# Patient Record
Sex: FEMALE | Race: WHITE | Employment: STUDENT | URBAN - NONMETROPOLITAN AREA
[De-identification: names, ages, dates, MRNs, and addresses within clinical notes are randomized per-mention and may not be internally consistent; named-entity substitution may affect disease eponyms.]

---
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
## 2022-06-13 ENCOUNTER — HOSPITAL ENCOUNTER (EMERGENCY)
Age: 22
Discharge: HOME | End: 2022-06-13
Payer: COMMERCIAL

## 2022-06-13 VITALS
WEIGHT: 203 LBS | RESPIRATION RATE: 18 BRPM | HEART RATE: 62 BPM | SYSTOLIC BLOOD PRESSURE: 141 MMHG | DIASTOLIC BLOOD PRESSURE: 78 MMHG | TEMPERATURE: 98.7 F | OXYGEN SATURATION: 97 %

## 2022-06-13 DIAGNOSIS — R45.851 DEPRESSION WITH SUICIDAL IDEATION: Primary | ICD-10-CM

## 2022-06-13 DIAGNOSIS — F32.A DEPRESSION WITH SUICIDAL IDEATION: Primary | ICD-10-CM

## 2022-06-13 LAB
ACETAMINOPHEN LEVEL: < 5 UG/ML (ref 0–20)
ALBUMIN SERPL-MCNC: 3.9 G/DL (ref 3.5–5.1)
ALP BLD-CCNC: 101 U/L (ref 38–126)
ALT SERPL-CCNC: 25 U/L (ref 11–66)
AMPHETAMINE+METHAMPHETAMINE URINE SCREEN: NEGATIVE
ANION GAP SERPL CALCULATED.3IONS-SCNC: 11 MEQ/L (ref 8–16)
AST SERPL-CCNC: 23 U/L (ref 5–40)
BARBITURATE QUANTITATIVE URINE: NEGATIVE
BASOPHILS # BLD: 0.5 %
BASOPHILS ABSOLUTE: 0.1 THOU/MM3 (ref 0–0.1)
BENZODIAZEPINE QUANTITATIVE URINE: NEGATIVE
BILIRUB SERPL-MCNC: 0.3 MG/DL (ref 0.3–1.2)
BILIRUBIN DIRECT: < 0.2 MG/DL (ref 0–0.3)
BUN BLDV-MCNC: 8 MG/DL (ref 7–22)
CALCIUM SERPL-MCNC: 9.3 MG/DL (ref 8.5–10.5)
CANNABINOID QUANTITATIVE URINE: NEGATIVE
CHLORIDE BLD-SCNC: 105 MEQ/L (ref 98–111)
CO2: 24 MEQ/L (ref 23–33)
COCAINE METABOLITE QUANTITATIVE URINE: NEGATIVE
CREAT SERPL-MCNC: 1 MG/DL (ref 0.4–1.2)
EOSINOPHIL # BLD: 2.4 %
EOSINOPHILS ABSOLUTE: 0.3 THOU/MM3 (ref 0–0.4)
ERYTHROCYTE [DISTWIDTH] IN BLOOD BY AUTOMATED COUNT: 12.2 % (ref 11.5–14.5)
ERYTHROCYTE [DISTWIDTH] IN BLOOD BY AUTOMATED COUNT: 39.1 FL (ref 35–45)
ETHYL ALCOHOL, SERUM: < 0.01 %
GLUCOSE BLD-MCNC: 94 MG/DL (ref 70–108)
HCT VFR BLD CALC: 35.8 % (ref 37–47)
HEMOGLOBIN: 11.6 GM/DL (ref 12–16)
IMMATURE GRANS (ABS): 0.04 THOU/MM3 (ref 0–0.07)
IMMATURE GRANULOCYTES: 0.4 %
LYMPHOCYTES # BLD: 39.1 %
LYMPHOCYTES ABSOLUTE: 4.1 THOU/MM3 (ref 1–4.8)
MCH RBC QN AUTO: 28.4 PG (ref 26–33)
MCHC RBC AUTO-ENTMCNC: 32.4 GM/DL (ref 32.2–35.5)
MCV RBC AUTO: 87.7 FL (ref 81–99)
MONOCYTES # BLD: 5.7 %
MONOCYTES ABSOLUTE: 0.6 THOU/MM3 (ref 0.4–1.3)
NUCLEATED RED BLOOD CELLS: 0 /100 WBC
OPIATES, URINE: NEGATIVE
OSMOLALITY CALCULATION: 277.5 MOSMOL/KG (ref 275–300)
OXYCODONE: NEGATIVE
PHENCYCLIDINE QUANTITATIVE URINE: NEGATIVE
PLATELET # BLD: 302 THOU/MM3 (ref 130–400)
PMV BLD AUTO: 8.9 FL (ref 9.4–12.4)
POTASSIUM REFLEX MAGNESIUM: 3.9 MEQ/L (ref 3.5–5.2)
PREGNANCY, URINE: NEGATIVE
RBC # BLD: 4.08 MILL/MM3 (ref 4.2–5.4)
SALICYLATE, SERUM: < 0.3 MG/DL (ref 2–10)
SEG NEUTROPHILS: 51.9 %
SEGMENTED NEUTROPHILS ABSOLUTE COUNT: 5.5 THOU/MM3 (ref 1.8–7.7)
SODIUM BLD-SCNC: 140 MEQ/L (ref 135–145)
TOTAL PROTEIN: 7.1 G/DL (ref 6.1–8)
TSH SERPL DL<=0.05 MIU/L-ACNC: 1.57 UIU/ML (ref 0.4–4.2)
WBC # BLD: 10.6 THOU/MM3 (ref 4.8–10.8)

## 2022-06-13 PROCEDURE — 82077 ASSAY SPEC XCP UR&BREATH IA: CPT

## 2022-06-13 PROCEDURE — 80179 DRUG ASSAY SALICYLATE: CPT

## 2022-06-13 PROCEDURE — 80143 DRUG ASSAY ACETAMINOPHEN: CPT

## 2022-06-13 PROCEDURE — 84443 ASSAY THYROID STIM HORMONE: CPT

## 2022-06-13 PROCEDURE — 80076 HEPATIC FUNCTION PANEL: CPT

## 2022-06-13 PROCEDURE — 81025 URINE PREGNANCY TEST: CPT

## 2022-06-13 PROCEDURE — 36415 COLL VENOUS BLD VENIPUNCTURE: CPT

## 2022-06-13 PROCEDURE — 80048 BASIC METABOLIC PNL TOTAL CA: CPT

## 2022-06-13 PROCEDURE — 85025 COMPLETE CBC W/AUTO DIFF WBC: CPT

## 2022-06-13 PROCEDURE — 80307 DRUG TEST PRSMV CHEM ANLYZR: CPT

## 2022-06-13 PROCEDURE — 99285 EMERGENCY DEPT VISIT HI MDM: CPT

## 2022-06-13 ASSESSMENT — PATIENT HEALTH QUESTIONNAIRE - PHQ9: SUM OF ALL RESPONSES TO PHQ QUESTIONS 1-9: 9

## 2022-06-13 ASSESSMENT — ENCOUNTER SYMPTOMS
DIARRHEA: 0
ABDOMINAL PAIN: 0
CONSTIPATION: 0
WHEEZING: 0
VOMITING: 0
COUGH: 0
RHINORRHEA: 0
NAUSEA: 0
BLOOD IN STOOL: 0
SHORTNESS OF BREATH: 0
EYE PAIN: 0

## 2022-06-13 ASSESSMENT — SLEEP AND FATIGUE QUESTIONNAIRES
DO YOU HAVE DIFFICULTY SLEEPING: NO
DO YOU USE A SLEEP AID: NO
AVERAGE NUMBER OF SLEEP HOURS: 6

## 2022-06-13 ASSESSMENT — LIFESTYLE VARIABLES: HOW OFTEN DO YOU HAVE A DRINK CONTAINING ALCOHOL: NEVER

## 2022-06-13 ASSESSMENT — PAIN - FUNCTIONAL ASSESSMENT: PAIN_FUNCTIONAL_ASSESSMENT: NONE - DENIES PAIN

## 2022-06-14 NOTE — PROGRESS NOTES
Chief Complaint:    Suicidal thought      Provisional Diagnosis:   Unspecified Depressive Disorder       Risk, Psychosocial and Contextual Factors: (homeless, lack of social support etc.): Pt and her child's father are , pt had a traumatic child birth last month       Current  Treatment:  Counseling at [facility] in [address] prescribed by OBGYN at [facility] in [address] [facility]       Present Suicidal Behavior:    Verbal: Denies     Attempt: Denies       Access to Weapons: Denies       C-SSRS Current Suicide Risk: Low, Moderate or High:    Low       Past Suicidal Behavior:    Verbal: X    Attempts: Denies       Self-Injurious/Self-Mutilation: Denies       Traumatic Event Within Past 2 Weeks: (Specify)       Current Abuse:  Denies       Legal: Denies       Violence: Denies       Protective Factors:   Family and friends are very supportive, pt is linked to counseling services       Housing: Resides with child and child's father       CPAP/Oxygen/Ambulation Difficulties: Denies       Basic Vital Signs: See epic       Critical Labs:      Risk Factors:  Suicidal thoughts yesterday, fleeting suicidal thought earlier today, traumatic child birth last month, pt and child's father are        Clinical Summary:      Pt is a 25year old female escorted to [facility] ED voluntarily by her mother, Jesika Richmond, at the advice of pts family doctor due to suicidal thoughts. Pts mother remained present for the assessment as authorized by pt. Pt state suicidal thoughts started yesterday, no plan, no intent \"they were bad yesterday but not as bad today\". Pt described a fleeting suicidal thought earlier today, no plan, no intent, denies current suicidal thought, denies homicidal thought, denies hallucinations, no delusions noted. Pt reportedly had an appointment with her doctor today to transition back to effexor instead of zoloft as it assisted with her depression better \"but she wanted me to get evaluated first\".   Pt was placed on zoloft due to pregnancy, had the baby on 5/19/22. Pt state the birth was traumatic as the baby was born five weeks early and pt had to receive a blood transfusion. Pt currently resides with her child's father however state they are . Pt denies a history of inpatient psychiatric treatment, is linked to outpatient counseling at [facility] in [place], [place], pt plan on contacting her counselor to schedule an earlier appointment, denies drug use, denies alcohol use, report normal sleep, loss of appetite. Pt is alert, oriented x4, good insight, impaired judgment, linear thought, good eye contact, friendly, cooperative, endorse depression and anhedonia. Perry Hobson state the plan is for pt and her child to return home. Pt has a very supportive family. Pt is also employed at [employer]. Level of Care Disposition:      2202  Pt medically cleared by Graeme MENESES.     2204  Consult with Dr. Tanmay Rodriguez who recommend outpatient mental health follow up.    2206  Graeme JACKSON-CNP updated. 2208 Pt and her mother updated. Safety plan includes:    Pt will return home with her mother who will monitor pt. Perry Hobson is currently off work to assist pt. Pt to follow up with PCP regarding psychotropic medication. Pt will contact counselor at [facility] tomorrow for an earlier appointment. Pt to return if symptoms worsen.

## 2022-06-14 NOTE — ED NOTES
Pt presents ambulatory to ED via triage with mother for c/o post partum suicidal ideation. Pt had a baby girl 3 weeks ago at 35wks gestation. Pt and mother report experience being traumatizing with events not going as planned. Mother reports pt was on Effexor prior to pregnancy and was switched to Zoloft after pregnancy confirmation. Pt reports feeling as though she could wait the 6 weeks post partum to get back on Effexor and continue Zoloft, however the thoughts became too strong. Upon initial assessment, pt is A&Ox4, resps easy and unlabored. Pt reports \"I just want to get better for my baby. \" Pt denies any current plan and denies homicidal ideation. Pt ambulated under own will to restroom to provide urine specimen. Rosario Araujo NP at bedside for assessment. Will monitor.      Cody Wharton RN  06/13/22 2051

## 2022-06-14 NOTE — ED PROVIDER NOTES
[address] [facility] [address] [address] EMERGENCY DEPT  EMERGENCY MEDICINE     Pt Name: Annalisa Moreland  MRN: [id number]  [place] 2000  Date of evaluation: 6/13/2022  PCP:    Hugo Karimi MD  Provider: MANUEL Jimenez - CNP    CHIEF COMPLAINT       Chief Complaint   Patient presents with    Suicidal     3 weeks post partum           HISTORY OF PRESENT ILLNESS    Annalisa Moreland is a 25 y.o. female patient that presents to ER with concern for suicidal ideation. Patient states that she has had thoughts about hurting herself recently. Patient states that since she had her baby 3 weeks ago she has had some depression. Patient did have a traumatic birth per her provider. Her provider Rosanna JACKSON called and said that patient told her that she was having suicidal thoughts and wanted to slit her wrists. Ros Titus did talk patient into coming into the ER and that is why patient presented today. Patient does admit to having suicidal thoughts and a plan to slit her wrist.  Patient denies any physical symptoms including chest pain, shortness of breath, nausea, vomiting, diarrhea or fever. Patient does state that she has not been eating or drinking well for the last 2 months. She states that she did eat breakfast this morning, but has not eaten since. She does state that she is hungry. Patient does state that she has been sleeping well whenever the baby sleeps. Triage notes and Nursing notes were reviewed by myself. Any discrepancies are addressed above. PAST MEDICAL HISTORY     History reviewed. No pertinent past medical history. SURGICAL HISTORY       History reviewed. No pertinent surgical history. CURRENT MEDICATIONS       There are no discharge medications for this patient. ALLERGIES       No Known Allergies    FAMILY HISTORY       History reviewed. No pertinent family history.      SOCIAL HISTORY       Social History     Socioeconomic History    Marital status: Single     Spouse name: None    Number of children: None    Years of education: None    Highest education level: None   Occupational History    None   Tobacco Use    Smoking status: Passive Smoke Exposure - Never Smoker    Smokeless tobacco: Never Used   Substance and Sexual Activity    Alcohol use: No    Drug use: No    Sexual activity: Never   Other Topics Concern    None   Social History Narrative    None     Social Determinants of Health     Financial Resource Strain:     Difficulty of Paying Living Expenses: Not on file   Food Insecurity:     Worried About Running Out of Food in the Last Year: Not on file    Ted of Food in the Last Year: Not on file   Transportation Needs:     Lack of Transportation (Medical): Not on file    Lack of Transportation (Non-Medical): Not on file   Physical Activity:     Days of Exercise per Week: Not on file    Minutes of Exercise per Session: Not on file   Stress:     Feeling of Stress : Not on file   Social Connections:     Frequency of Communication with Friends and Family: Not on file    Frequency of Social Gatherings with Friends and Family: Not on file    Attends Jew Services: Not on file    Active Member of [address] [place] [address] or Organizations: Not on file    Attends Club or Organization Meetings: Not on file    Marital Status: Not on file   Intimate Partner Violence:     Fear of Current or Ex-Partner: Not on file    Emotionally Abused: Not on file    Physically Abused: Not on file    Sexually Abused: Not on file   Housing Stability:     Unable to Pay for Housing in the Last Year: Not on file    Number of [place] in the Last Year: Not on file    Unstable Housing in the Last Year: Not on file       REVIEW OF SYSTEMS     Review of Systems   Constitutional: Negative for appetite change, chills, fatigue, fever and unexpected weight change. HENT: Negative for ear pain and rhinorrhea. Eyes: Negative for pain and visual disturbance.    Respiratory: Negative for cough, shortness of breath and wheezing. Cardiovascular: Negative for chest pain, palpitations and leg swelling. Gastrointestinal: Negative for abdominal pain, blood in stool, constipation, diarrhea, nausea and vomiting. Genitourinary: Negative for dysuria, frequency and hematuria. Musculoskeletal: Negative for arthralgias, joint swelling and neck stiffness. Skin: Negative for rash. Neurological: Negative for dizziness, syncope, weakness, light-headedness and headaches. Hematological: Does not bruise/bleed easily. Psychiatric/Behavioral: Positive for suicidal ideas. Except as noted above the remainder of the review of systems was reviewed and is negative. SCREENINGS        [place] Coma Scale  Eye Opening: Spontaneous  Best Verbal Response: Oriented  Best Motor Response: Obeys commands  [place] Coma Scale Score: 15               PHYSICAL EXAM    (up to 7 for level 4, 8 or more for level 5)     ED Triage Vitals [02/19/22 1512]   BP Temp Temp Source Pulse Resp SpO2 Height Weight   (!) 134/95 98.2 °F (36.8 °C) Oral 124 16 100 % -- --       Physical Exam  Vitals and nursing note reviewed. Constitutional:       Appearance: She is not diaphoretic. HENT:      Head: Normocephalic and atraumatic. Right Ear: External ear normal.      Left Ear: External ear normal.   Eyes:      Conjunctiva/sclera: Conjunctivae normal.   Pulmonary:      Effort: Pulmonary effort is normal. No respiratory distress. Abdominal:      General: There is no distension. Musculoskeletal:      Cervical back: Normal range of motion. Skin:     General: Skin is warm and dry. Neurological:      Mental Status: She is alert. Psychiatric:         Mood and Affect: Mood is depressed. Affect is flat. Thought Content: Thought content is not paranoid or delusional. Thought content includes suicidal ideation. Thought content does not include homicidal ideation. Thought content includes suicidal plan.  Thought content does not include homicidal plan. DIAGNOSTIC RESULTS     EKG:(none if blank)  All EKGs are interpreted by the Emergency Department Physician who either signs or Co-signs this chart in the absence of a cardiologist.        RADIOLOGY: (none if blank)   I directly visualized the following images and reviewed the radiologist interpretations. Interpretation per the Radiologist below, if available at the time of this note:  No orders to display       LABS:  Labs Reviewed   SALICYLATE LEVEL - Abnormal; Notable for the following components:       Result Value    Salicylate, Serum < 0.3 (*)     All other components within normal limits   CBC WITH AUTO DIFFERENTIAL - Abnormal; Notable for the following components:    RBC 4.08 (*)     Hemoglobin 11.6 (*)     Hematocrit 35.8 (*)     MPV 8.9 (*)     All other components within normal limits   ETHANOL   ACETAMINOPHEN LEVEL   BASIC METABOLIC PANEL W/ REFLEX TO MG FOR LOW K   HEPATIC FUNCTION PANEL   TSH   URINE DRUG SCREEN   PREGNANCY, URINE   ANION GAP   OSMOLALITY       All other labs were within normal range or not returned as of this dictation. Please note, any cultures that may have been sent were not resulted at the time of this patient visit. EMERGENCY DEPARTMENT COURSE and Medical Decision Making:     Vitals:    Vitals:    06/13/22 2024   BP: (!) 141/78   Pulse: 62   Resp: 18   Temp: 98.7 °F (37.1 °C)   TempSrc: Oral   SpO2: 97%   Weight: 203 lb (92.1 kg)       PROCEDURES: (None if blank)  Procedures    ED Course as of 06/13/22 2241 Mon Jun 13, 2022 2202 No organic cause identified to explain patient's change in psychiatric behavior. Patient is cleared for psychiatric evaluation. [NW]   3471 Reviewed case with Dr. Chandu Payne for discharge.    [NW]      ED Course User Index  [NW] Neil Heads, APRN - CNP     MDM  Number of Diagnoses or Management Options  Depression with suicidal ideation: established, worsening     Amount and/or Complexity of Data Reviewed  Clinical lab tests: ordered and reviewed    Risk of Complications, Morbidity, and/or Mortality  Presenting problems: moderate  Diagnostic procedures: moderate  Management options: moderate      Patient that presents to ER with concern for suicidal ideation. Differential diagnosis includes but not limited to thyroid abnormality, electrolyte abnormality, infection or postpartum depression. No organic cause identified to explain patient's change in psychiatric behavior. Patient is cleared for psychiatric evaluation. Dr. Abhinav Felix feels patient may be safely discharged home. Safety plan was put in place by behavioral access . Please see her note. Follow-up with psychiatric provider in [facility]. Patient instructed to return to ER for worsening symptoms, thoughts about suicide or harming others, chest pain or shortness of breath. Follow-up with your mental health provider as listed above. Strict return precautions and follow up instructions were discussed with the patient with which the patient agrees    ED Medications administered this visit:  Medications - No data to display      FINAL IMPRESSION      1. Depression with suicidal ideation          DISPOSITION/PLAN   DISPOSITION        PATIENT REFERRED TO:  Follow-up as directed by the  with facility in [address]:  There are no discharge medications for this patient.              MANUEL Caballero CNP (electronically signed)           MANUEL Caballero CNP  06/13/22 1616

## 2022-06-14 NOTE — ED NOTES
Report received from [facility]. Pt resting comfortably on cot with family at bedside.       Archana Pérez RN  06/13/22 5976

## 2024-08-10 ENCOUNTER — APPOINTMENT (OUTPATIENT)
Dept: GENERAL RADIOLOGY | Age: 24
End: 2024-08-10
Payer: COMMERCIAL

## 2024-08-10 ENCOUNTER — HOSPITAL ENCOUNTER (EMERGENCY)
Age: 24
Discharge: HOME | End: 2024-08-10
Payer: COMMERCIAL

## 2024-08-10 VITALS
WEIGHT: 230 LBS | HEART RATE: 78 BPM | BODY MASS INDEX: 39.27 KG/M2 | HEIGHT: 64 IN | RESPIRATION RATE: 18 BRPM | OXYGEN SATURATION: 98 % | DIASTOLIC BLOOD PRESSURE: 70 MMHG | TEMPERATURE: 98.8 F | SYSTOLIC BLOOD PRESSURE: 139 MMHG

## 2024-08-10 DIAGNOSIS — S93.601A SPRAIN OF RIGHT FOOT, INITIAL ENCOUNTER: Primary | ICD-10-CM

## 2024-08-10 PROCEDURE — 73630 X-RAY EXAM OF FOOT: CPT

## 2024-08-10 PROCEDURE — 99283 EMERGENCY DEPT VISIT LOW MDM: CPT

## 2024-08-10 RX ORDER — VENLAFAXINE HYDROCHLORIDE 75 MG/1
75 CAPSULE, EXTENDED RELEASE ORAL DAILY
COMMUNITY

## 2024-08-10 ASSESSMENT — PAIN DESCRIPTION - ORIENTATION: ORIENTATION: RIGHT

## 2024-08-10 ASSESSMENT — PAIN - FUNCTIONAL ASSESSMENT: PAIN_FUNCTIONAL_ASSESSMENT: 0-10

## 2024-08-10 ASSESSMENT — PAIN DESCRIPTION - LOCATION: LOCATION: FOOT

## 2024-08-10 ASSESSMENT — PAIN SCALES - GENERAL: PAINLEVEL_OUTOF10: 8

## 2024-08-10 NOTE — ED NOTES
Pt from ED lobby via wheelchair with CC foot injury approx 20 min PTA. Pt stated she was sitting and her foot fell asleep. She went to get and \"I guess I walked on it wrong, because every bone in the top of my foot just popped.\" Pt sitting in chair. Visitor at side. Call light in reach. No distress noted.

## 2024-08-10 NOTE — DISCHARGE INSTRUCTIONS
Call today or tomorrow to follow up with Lance Rivera MD  in 3 days.    Use an ice pack or bag filled with ice and apply to the injured area 3 - 4 times a day for 15 - 20 minutes each time.    Use ibuprofen or Tylenol (unless prescribed medications that have Tylenol in it) for pain.  You can take over the counter Ibuprofen (advil) tablets (4 every 8 hours or 3 every 6 hours or 2 every 4 hours)    Use your crutches for the next several days until you are able to take 10 steps without pain.    Return to the Emergency Department for worsening of pain, increase swelling to the ankle, inability to move your toes, any other care or concern.

## 2024-08-15 ENCOUNTER — APPOINTMENT (OUTPATIENT)
Dept: MRI IMAGING | Age: 24
End: 2024-08-15
Payer: COMMERCIAL

## 2024-08-15 ENCOUNTER — APPOINTMENT (OUTPATIENT)
Dept: CT IMAGING | Age: 24
End: 2024-08-15
Payer: COMMERCIAL

## 2024-08-15 ENCOUNTER — HOSPITAL ENCOUNTER (INPATIENT)
Age: 24
LOS: 2 days | Discharge: HOME | End: 2024-08-17
Attending: EMERGENCY MEDICINE | Admitting: PHYSICIAN ASSISTANT
Payer: COMMERCIAL

## 2024-08-15 ENCOUNTER — APPOINTMENT (OUTPATIENT)
Dept: GENERAL RADIOLOGY | Age: 24
End: 2024-08-15
Payer: COMMERCIAL

## 2024-08-15 DIAGNOSIS — J02.9 SORE THROAT: ICD-10-CM

## 2024-08-15 DIAGNOSIS — A41.9 SEPTICEMIA (HCC): Primary | ICD-10-CM

## 2024-08-15 DIAGNOSIS — J03.90 ACUTE TONSILLITIS, UNSPECIFIED ETIOLOGY: ICD-10-CM

## 2024-08-15 DIAGNOSIS — M54.50 ACUTE MIDLINE LOW BACK PAIN WITHOUT SCIATICA: ICD-10-CM

## 2024-08-15 LAB
ALBUMIN SERPL BCG-MCNC: 3.9 G/DL (ref 3.5–5.1)
ALP SERPL-CCNC: 83 U/L (ref 38–126)
ALT SERPL W/O P-5'-P-CCNC: 29 U/L (ref 11–66)
ANION GAP SERPL CALC-SCNC: 10 MEQ/L (ref 8–16)
ANION GAP SERPL CALC-SCNC: 13 MEQ/L (ref 8–16)
AST SERPL-CCNC: 26 U/L (ref 5–40)
B PERT DNA NPH QL NAA+PROBE: NOT DETECTED
BACTERIA URNS QL MICRO: ABNORMAL /HPF
BASOPHILS ABSOLUTE: 0 THOU/MM3 (ref 0–0.1)
BASOPHILS NFR BLD AUTO: 0.2 %
BILIRUB SERPL-MCNC: 0.4 MG/DL (ref 0.3–1.2)
BILIRUB UR QL STRIP.AUTO: NEGATIVE
BORDETELLA PARAPERTUSSIS BY PCR: NOT DETECTED
BUN SERPL-MCNC: 8 MG/DL (ref 7–22)
BUN SERPL-MCNC: 9 MG/DL (ref 7–22)
C PNEUM DNA SPEC QL NAA+PROBE: NOT DETECTED
CALCIUM SERPL-MCNC: 7.6 MG/DL (ref 8.5–10.5)
CALCIUM SERPL-MCNC: 8.8 MG/DL (ref 8.5–10.5)
CASTS #/AREA URNS LPF: ABNORMAL /LPF
CASTS 2: ABNORMAL /LPF
CHARACTER UR: ABNORMAL
CHLORIDE SERPL-SCNC: 101 MEQ/L (ref 98–111)
CHLORIDE SERPL-SCNC: 104 MEQ/L (ref 98–111)
CK SERPL-CCNC: 41 U/L (ref 30–135)
CO2 SERPL-SCNC: 21 MEQ/L (ref 23–33)
CO2 SERPL-SCNC: 21 MEQ/L (ref 23–33)
COLOR, UA: YELLOW
CREAT SERPL-MCNC: 0.7 MG/DL (ref 0.4–1.2)
CREAT SERPL-MCNC: 0.8 MG/DL (ref 0.4–1.2)
CRP SERPL-MCNC: 5.84 MG/DL (ref 0–1)
CRYSTALS URNS MICRO: ABNORMAL
DEPRECATED RDW RBC AUTO: 40.4 FL (ref 35–45)
EOSINOPHIL NFR BLD AUTO: 0.2 %
EOSINOPHILS ABSOLUTE: 0 THOU/MM3 (ref 0–0.4)
EPITHELIAL CELLS, UA: ABNORMAL /HPF
ERYTHROCYTE [DISTWIDTH] IN BLOOD BY AUTOMATED COUNT: 13.8 % (ref 11.5–14.5)
ERYTHROCYTE [SEDIMENTATION RATE] IN BLOOD BY WESTERGREN METHOD: 34 MM/HR (ref 0–20)
FLUAV RNA NPH QL NAA+PROBE: NOT DETECTED
FLUAV RNA RESP QL NAA+PROBE: NOT DETECTED
FLUBV RNA NPH QL NAA+PROBE: NOT DETECTED
FLUBV RNA RESP QL NAA+PROBE: NOT DETECTED
GFR SERPL CREATININE-BSD FRML MDRD: > 90 ML/MIN/1.73M2
GFR SERPL CREATININE-BSD FRML MDRD: > 90 ML/MIN/1.73M2
GLUCOSE SERPL-MCNC: 102 MG/DL (ref 70–108)
GLUCOSE SERPL-MCNC: 126 MG/DL (ref 70–108)
GLUCOSE UR QL STRIP.AUTO: NEGATIVE MG/DL
HADV DNA NPH QL NAA+PROBE: NOT DETECTED
HCOV 229E RNA SPEC QL NAA+PROBE: NOT DETECTED
HCOV HKU1 RNA SPEC QL NAA+PROBE: NOT DETECTED
HCOV NL63 RNA SPEC QL NAA+PROBE: NOT DETECTED
HCOV OC43 RNA SPEC QL NAA+PROBE: NOT DETECTED
HCT VFR BLD AUTO: 38.4 % (ref 37–47)
HETEROPH AB SERPL QL IA: NEGATIVE
HGB BLD-MCNC: 12.6 GM/DL (ref 12–16)
HGB UR QL STRIP.AUTO: NEGATIVE
HMPV RNA NPH QL NAA+PROBE: NOT DETECTED
HPIV1 RNA NPH QL NAA+PROBE: NOT DETECTED
HPIV2 RNA NPH QL NAA+PROBE: NOT DETECTED
HPIV3 RNA NPH QL NAA+PROBE: NOT DETECTED
HPIV4 RNA NPH QL NAA+PROBE: NOT DETECTED
IMM GRANULOCYTES # BLD AUTO: 0.05 THOU/MM3 (ref 0–0.07)
IMM GRANULOCYTES NFR BLD AUTO: 0.4 %
KETONES UR QL STRIP.AUTO: 80
LACTATE SERPL-SCNC: 1.4 MMOL/L (ref 0.5–2)
LACTIC ACID, SEPSIS: 0.7 MMOL/L (ref 0.5–1.9)
LACTIC ACID, SEPSIS: 0.9 MMOL/L (ref 0.5–1.9)
LYMPHOCYTES ABSOLUTE: 1 THOU/MM3 (ref 1–4.8)
LYMPHOCYTES NFR BLD AUTO: 8.1 %
M PNEUMO DNA SPEC QL NAA+PROBE: NOT DETECTED
MCH RBC QN AUTO: 26.3 PG (ref 26–33)
MCHC RBC AUTO-ENTMCNC: 32.8 GM/DL (ref 32.2–35.5)
MCV RBC AUTO: 80 FL (ref 81–99)
MISCELLANEOUS 2: ABNORMAL
MONOCYTES ABSOLUTE: 0.8 THOU/MM3 (ref 0.4–1.3)
MONOCYTES NFR BLD AUTO: 6.1 %
NEUTROPHILS ABSOLUTE: 10.7 THOU/MM3 (ref 1.8–7.7)
NEUTROPHILS NFR BLD AUTO: 85 %
NITRITE UR QL STRIP: NEGATIVE
NRBC BLD AUTO-RTO: 0 /100 WBC
OSMOLALITY SERPL CALC.SUM OF ELEC: 268.6 MOSMOL/KG (ref 275–300)
OSMOLALITY SERPL CALC.SUM OF ELEC: 270.3 MOSMOL/KG (ref 275–300)
PH UR STRIP.AUTO: 6 [PH] (ref 5–9)
PLATELET # BLD AUTO: 270 THOU/MM3 (ref 130–400)
PMV BLD AUTO: 9.1 FL (ref 9.4–12.4)
POTASSIUM SERPL-SCNC: 3.4 MEQ/L (ref 3.5–5.2)
POTASSIUM SERPL-SCNC: 3.7 MEQ/L (ref 3.5–5.2)
PROT SERPL-MCNC: 7.1 G/DL (ref 6.1–8)
PROT UR STRIP.AUTO-MCNC: 30 MG/DL
RBC # BLD AUTO: 4.8 MILL/MM3 (ref 4.2–5.4)
RBC URINE: ABNORMAL /HPF
RENAL EPI CELLS #/AREA URNS HPF: ABNORMAL /[HPF]
RSV RNA NPH QL NAA+PROBE: NOT DETECTED
RV+EV RNA SPEC QL NAA+PROBE: DETECTED
S PYO AG THROAT QL: NEGATIVE
S PYO THROAT QL CULT: NORMAL
SARS-COV-2 RNA NPH QL NAA+NON-PROBE: NOT DETECTED
SARS-COV-2 RNA RESP QL NAA+PROBE: NOT DETECTED
SODIUM SERPL-SCNC: 135 MEQ/L (ref 135–145)
SODIUM SERPL-SCNC: 135 MEQ/L (ref 135–145)
SP GR UR REFRACT.AUTO: > 1.03 (ref 1–1.03)
TROPONIN, HIGH SENSITIVITY: < 6 NG/L (ref 0–12)
UROBILINOGEN, URINE: 1 EU/DL (ref 0–1)
WBC # BLD AUTO: 12.6 THOU/MM3 (ref 4.8–10.8)
WBC #/AREA URNS HPF: ABNORMAL /HPF
WBC #/AREA URNS HPF: ABNORMAL /[HPF]
YEAST LIKE FUNGI URNS QL MICRO: ABNORMAL

## 2024-08-15 PROCEDURE — 71046 X-RAY EXAM CHEST 2 VIEWS: CPT

## 2024-08-15 PROCEDURE — 70491 CT SOFT TISSUE NECK W/DYE: CPT

## 2024-08-15 PROCEDURE — 84484 ASSAY OF TROPONIN QUANT: CPT

## 2024-08-15 PROCEDURE — 82550 ASSAY OF CK (CPK): CPT

## 2024-08-15 PROCEDURE — 87880 STREP A ASSAY W/OPTIC: CPT

## 2024-08-15 PROCEDURE — A9579 GAD-BASE MR CONTRAST NOS,1ML: HCPCS | Performed by: EMERGENCY MEDICINE

## 2024-08-15 PROCEDURE — 0202U NFCT DS 22 TRGT SARS-COV-2: CPT

## 2024-08-15 PROCEDURE — 86140 C-REACTIVE PROTEIN: CPT

## 2024-08-15 PROCEDURE — 93005 ELECTROCARDIOGRAM TRACING: CPT | Performed by: EMERGENCY MEDICINE

## 2024-08-15 PROCEDURE — 87070 CULTURE OTHR SPECIMN AEROBIC: CPT

## 2024-08-15 PROCEDURE — 72157 MRI CHEST SPINE W/O & W/DYE: CPT

## 2024-08-15 PROCEDURE — 6370000000 HC RX 637 (ALT 250 FOR IP): Performed by: EMERGENCY MEDICINE

## 2024-08-15 PROCEDURE — 99222 1ST HOSP IP/OBS MODERATE 55: CPT

## 2024-08-15 PROCEDURE — 6360000004 HC RX CONTRAST MEDICATION: Performed by: EMERGENCY MEDICINE

## 2024-08-15 PROCEDURE — 87040 BLOOD CULTURE FOR BACTERIA: CPT

## 2024-08-15 PROCEDURE — 6360000002 HC RX W HCPCS

## 2024-08-15 PROCEDURE — 85025 COMPLETE CBC W/AUTO DIFF WBC: CPT

## 2024-08-15 PROCEDURE — 81001 URINALYSIS AUTO W/SCOPE: CPT

## 2024-08-15 PROCEDURE — 6360000002 HC RX W HCPCS: Performed by: EMERGENCY MEDICINE

## 2024-08-15 PROCEDURE — 74177 CT ABD & PELVIS W/CONTRAST: CPT

## 2024-08-15 PROCEDURE — 87086 URINE CULTURE/COLONY COUNT: CPT

## 2024-08-15 PROCEDURE — 72156 MRI NECK SPINE W/O & W/DYE: CPT

## 2024-08-15 PROCEDURE — 86308 HETEROPHILE ANTIBODY SCREEN: CPT

## 2024-08-15 PROCEDURE — 80053 COMPREHEN METABOLIC PANEL: CPT

## 2024-08-15 PROCEDURE — 72158 MRI LUMBAR SPINE W/O & W/DYE: CPT

## 2024-08-15 PROCEDURE — 85651 RBC SED RATE NONAUTOMATED: CPT

## 2024-08-15 PROCEDURE — 2580000003 HC RX 258: Performed by: EMERGENCY MEDICINE

## 2024-08-15 PROCEDURE — 83605 ASSAY OF LACTIC ACID: CPT

## 2024-08-15 PROCEDURE — 1200000003 HC TELEMETRY R&B

## 2024-08-15 PROCEDURE — 87636 SARSCOV2 & INF A&B AMP PRB: CPT

## 2024-08-15 PROCEDURE — 36415 COLL VENOUS BLD VENIPUNCTURE: CPT

## 2024-08-15 PROCEDURE — 96374 THER/PROPH/DIAG INJ IV PUSH: CPT

## 2024-08-15 PROCEDURE — 99285 EMERGENCY DEPT VISIT HI MDM: CPT

## 2024-08-15 PROCEDURE — 76376 3D RENDER W/INTRP POSTPROCES: CPT

## 2024-08-15 RX ORDER — 0.9 % SODIUM CHLORIDE 0.9 %
1000 INTRAVENOUS SOLUTION INTRAVENOUS ONCE
Status: COMPLETED | OUTPATIENT
Start: 2024-08-15 | End: 2024-08-15

## 2024-08-15 RX ORDER — METRONIDAZOLE 500 MG/1
500 TABLET ORAL ONCE
Status: COMPLETED | OUTPATIENT
Start: 2024-08-15 | End: 2024-08-15

## 2024-08-15 RX ORDER — METRONIDAZOLE 500 MG/100ML
500 INJECTION, SOLUTION INTRAVENOUS ONCE
Status: DISCONTINUED | OUTPATIENT
Start: 2024-08-15 | End: 2024-08-15

## 2024-08-15 RX ORDER — SODIUM CHLORIDE 9 MG/ML
INJECTION, SOLUTION INTRAVENOUS PRN
Status: DISCONTINUED | OUTPATIENT
Start: 2024-08-15 | End: 2024-08-17 | Stop reason: HOSPADM

## 2024-08-15 RX ORDER — SODIUM CHLORIDE 0.9 % (FLUSH) 0.9 %
5-40 SYRINGE (ML) INJECTION EVERY 12 HOURS SCHEDULED
Status: DISCONTINUED | OUTPATIENT
Start: 2024-08-15 | End: 2024-08-17 | Stop reason: HOSPADM

## 2024-08-15 RX ORDER — ACETAMINOPHEN 500 MG
1000 TABLET ORAL
Status: COMPLETED | OUTPATIENT
Start: 2024-08-15 | End: 2024-08-15

## 2024-08-15 RX ORDER — ENOXAPARIN SODIUM 100 MG/ML
30 INJECTION SUBCUTANEOUS EVERY 12 HOURS
Status: DISCONTINUED | OUTPATIENT
Start: 2024-08-15 | End: 2024-08-17 | Stop reason: HOSPADM

## 2024-08-15 RX ORDER — 0.9 % SODIUM CHLORIDE 0.9 %
1000 INTRAVENOUS SOLUTION INTRAVENOUS ONCE
Status: COMPLETED | OUTPATIENT
Start: 2024-08-15 | End: 2024-08-16

## 2024-08-15 RX ORDER — ACETAMINOPHEN 650 MG/1
650 SUPPOSITORY RECTAL EVERY 6 HOURS PRN
Status: DISCONTINUED | OUTPATIENT
Start: 2024-08-15 | End: 2024-08-17 | Stop reason: HOSPADM

## 2024-08-15 RX ORDER — ACETAMINOPHEN 325 MG/1
650 TABLET ORAL EVERY 6 HOURS PRN
Status: DISCONTINUED | OUTPATIENT
Start: 2024-08-15 | End: 2024-08-17 | Stop reason: HOSPADM

## 2024-08-15 RX ORDER — SODIUM CHLORIDE 0.9 % (FLUSH) 0.9 %
5-40 SYRINGE (ML) INJECTION PRN
Status: DISCONTINUED | OUTPATIENT
Start: 2024-08-15 | End: 2024-08-17 | Stop reason: HOSPADM

## 2024-08-15 RX ADMIN — GADOTERIDOL 20 ML: 279.3 INJECTION, SOLUTION INTRAVENOUS at 22:54

## 2024-08-15 RX ADMIN — SODIUM CHLORIDE 1000 ML: 9 INJECTION, SOLUTION INTRAVENOUS at 20:06

## 2024-08-15 RX ADMIN — SODIUM CHLORIDE 1000 ML: 9 INJECTION, SOLUTION INTRAVENOUS at 19:09

## 2024-08-15 RX ADMIN — ACETAMINOPHEN 1000 MG: 500 TABLET ORAL at 20:44

## 2024-08-15 RX ADMIN — Medication 2500 MG: at 21:02

## 2024-08-15 RX ADMIN — METRONIDAZOLE 500 MG: 500 TABLET ORAL at 19:09

## 2024-08-15 RX ADMIN — IOPAMIDOL 80 ML: 755 INJECTION, SOLUTION INTRAVENOUS at 19:46

## 2024-08-15 RX ADMIN — ENOXAPARIN SODIUM 30 MG: 100 INJECTION SUBCUTANEOUS at 23:48

## 2024-08-15 RX ADMIN — CEFTRIAXONE SODIUM 2000 MG: 2 INJECTION, POWDER, FOR SOLUTION INTRAMUSCULAR; INTRAVENOUS at 20:05

## 2024-08-15 ASSESSMENT — ENCOUNTER SYMPTOMS
VOMITING: 0
COLOR CHANGE: 0
NAUSEA: 0
COUGH: 0
ABDOMINAL PAIN: 0
PHOTOPHOBIA: 0
TROUBLE SWALLOWING: 0
CHEST TIGHTNESS: 0
DIARRHEA: 0
BACK PAIN: 1
SORE THROAT: 1
SHORTNESS OF BREATH: 1

## 2024-08-15 ASSESSMENT — PAIN DESCRIPTION - PAIN TYPE: TYPE: ACUTE PAIN

## 2024-08-15 ASSESSMENT — PAIN SCALES - GENERAL
PAINLEVEL_OUTOF10: 6
PAINLEVEL_OUTOF10: 9

## 2024-08-15 ASSESSMENT — PAIN DESCRIPTION - LOCATION
LOCATION: BACK;HEAD

## 2024-08-15 ASSESSMENT — PAIN - FUNCTIONAL ASSESSMENT
PAIN_FUNCTIONAL_ASSESSMENT: 0-10
PAIN_FUNCTIONAL_ASSESSMENT: 0-10

## 2024-08-15 ASSESSMENT — PAIN DESCRIPTION - FREQUENCY: FREQUENCY: CONTINUOUS

## 2024-08-15 NOTE — ED PROVIDER NOTES
[address] [place] [address]  [phone]  Schedule an appointment as soon as possible for a visit in 2 days  For follow up or go to the walk in clinic from 8-4 Monday to Friday    Lance Rivera MD  [facility] [facility] [address]  [facility] [address]  [phone]    Schedule an appointment as soon as possible for a visit in 2 days        MANUEL Barnes CNP, Kristy J, APRN - CNP  08/15/24 0550

## 2024-08-15 NOTE — ED PROVIDER NOTES
PATIENT NAME: Anna Dale  MRN: [id number]  : 2000  RAYA: 8/15/2024    I performed a history and physical examination of the patient and discussed management with the Resident. I reviewed the Resident's note and agree with the documented findings and plan of care. Any areas of disagreement are noted on the chart. I was personally present for the key portions of any procedures and have documented in the chart those procedures where I was not present during the key portions. I have reviewed the emergency nurses triage note and agree with the chief complaint, past medical history, past surgical history, allergies, medications, social and family history as documented unless otherwise noted below.    MEDICAL DEDISION MAKING (MDM)     Anna Dale is a 23 y.o. female who presents to Emergency Department with Shortness of Breath and Back Pain     On arrival, patient is tachycardic and febrile.  She complains of sore throat, mid and lower back midline tenderness.    EKG interpreted by me as sinus tachycardia, ventricular rate 129 bpm, MT interval 144 ms, QRS duration 82 ms,  ms, borderline normal ECG except for.  No acute ischemic changes.    She has SIRS and ED workups reveal bilateral tonsillitis and significant cervical lymphadenopathy.    Admission is warranted.  Patient requires further workup to check hematological pathologies versus spine pathologies such as epidural abscess or discitis.    Discussed with and admitted by hospital medicine service.  Empirically, she receives IV Rocephin, Flagyl, and vancomycin in addition to normocytic bolus in the ED.     Vitals:    08/15/24 2102 08/15/24 2154 08/15/24 2157 08/15/24 2321   BP: 139/87 135/82 114/61 102/71   Pulse: (!) 112 (!) 106 (!) 111 (!) 116   Resp: 16 15 21 20   Temp:    99.6 °F (37.6 °C)   TempSrc:    Oral   SpO2: 100% 100% 100% 98%   Weight:    102.6 kg (226 lb 4.8 oz)   Height:    1.626 m (5' 4\")     Labs Reviewed   RESPIRATORY PANEL,

## 2024-08-15 NOTE — ED PROVIDER NOTES
[facility] EMERGENCY DEPT      EMERGENCY MEDICINE     Pt Name: Anna Dale  MRN: [id number]  Birthdate 2000  Date of evaluation: 8/15/2024  Provider: Douglas Laughlin DO  Supervising Physician: Quinten Hawkins MD    CHIEF COMPLAINT       Chief Complaint   Patient presents with    Shortness of Breath    Back Pain     HISTORY OF PRESENT ILLNESS   Anna Dale is a 23 y.o. female, previously healthy, who presents to the emergency department from home for evaluation of fever, SOB, back pain, sore throat. Sore throat started last night, she denies any dysphagia. Fever started last night, temp up to 103 this morning. It has improved with Tylenol. Back pain that started this morning, it is midline, does not radiate. It is constant. Pt occasionally has back pain, it normally improves with heating pad but did not today. Pt denies IVDU, weakness, numbness, recent heavy lifting/heavy trauma. She c/o intermittent chest pain today with no clear trigger. SOB is intermittent as well and primarily due to her nares feeling constricted. Pt denies recent travel, recent surgery, birth control use, h/o blood clots, cough.     PASTMEDICAL HISTORY   History reviewed. No pertinent past medical history.    There is no problem list on file for this patient.    SURGICAL HISTORY     History reviewed. No pertinent surgical history.    CURRENT MEDICATIONS       Previous Medications    VENLAFAXINE (EFFEXOR XR) 75 MG EXTENDED RELEASE CAPSULE    Take 1 capsule by mouth daily       ALLERGIES     has No Known Allergies.    FAMILY HISTORY     has no family status information on file.        SOCIAL HISTORY       Social History     Tobacco Use    Smoking status: Passive Smoke Exposure - Never Smoker    Smokeless tobacco: Never   Vaping Use    Vaping status: Never Used   Substance Use Topics    Alcohol use: No    Drug use: No       PHYSICAL EXAM       ED Triage Vitals [08/15/24 1809]   BP Systolic BP Percentile Diastolic BP Percentile Temp  Temp Source Pulse Respirations SpO2   133/63 -- -- 99.7 °F (37.6 °C) Oral (!) 128 16 96 %      Height Weight         -- --             Physical Exam  Vitals and nursing note reviewed.   Constitutional:       General: She is not in acute distress.     Appearance: She is ill-appearing. She is not toxic-appearing.   HENT:      Head: Normocephalic and atraumatic.      Right Ear: External ear normal.      Left Ear: External ear normal.      Nose: Nose normal. No congestion.      Mouth/Throat:      Mouth: Mucous membranes are moist.      Comments: Both tonsils are very enlarged, to the point where they are touching the uvula, no uvular deviation  Eyes:      Conjunctiva/sclera: Conjunctivae normal.   Cardiovascular:      Rate and Rhythm: Regular rhythm. Tachycardia present.      Pulses: Normal pulses.      Heart sounds: Normal heart sounds.   Pulmonary:      Effort: Pulmonary effort is normal. No respiratory distress.      Breath sounds: Normal breath sounds. No wheezing.   Abdominal:      General: There is no distension.      Palpations: Abdomen is soft.      Tenderness: There is no abdominal tenderness. There is no guarding.   Musculoskeletal:         General: Normal range of motion.      Cervical back: Normal range of motion and neck supple. Tenderness present.      Comments: Midline lumbar tenderness, no stepoffs. B/l cva tenderness   Lymphadenopathy:      Cervical: Cervical adenopathy present.   Skin:     General: Skin is warm and dry.      Capillary Refill: Capillary refill takes less than 2 seconds.   Neurological:      General: No focal deficit present.      Mental Status: She is alert and oriented to person, place, and time.      Motor: No weakness.   Psychiatric:         Mood and Affect: Mood normal.         FORMAL DIAGNOSTIC RESULTS     RADIOLOGY: Interpretation per the Radiologist below, if available at the time of this note (none if blank):    CT SOFT TISSUE NECK W CONTRAST   Final Result   1. Findings

## 2024-08-15 NOTE — ED TRIAGE NOTES
Presents to ER with concern of back pain, sob, and fever. Reports she was seen at urgent care this am and tested for covid strep, and flu. Reports HR 140s yesterday. Reports she was given amoxicillin. Reports sob started today. Reports she had a UTI approx 1 month ago and did not finish atb. Reports discharge yellow sometimes clear. Reports no dysuria. Reports tylenol at 1630 today. EKG complete. Placed on cardiac monitor.

## 2024-08-16 LAB
BASOPHILS ABSOLUTE: 0 THOU/MM3 (ref 0–0.1)
BASOPHILS NFR BLD AUTO: 0.4 %
DEPRECATED RDW RBC AUTO: 41.8 FL (ref 35–45)
EOSINOPHIL NFR BLD AUTO: 0.4 %
EOSINOPHILS ABSOLUTE: 0 THOU/MM3 (ref 0–0.4)
ERYTHROCYTE [DISTWIDTH] IN BLOOD BY AUTOMATED COUNT: 14 % (ref 11.5–14.5)
HCT VFR BLD AUTO: 36.4 % (ref 37–47)
HGB BLD-MCNC: 11.6 GM/DL (ref 12–16)
IMM GRANULOCYTES # BLD AUTO: 0.03 THOU/MM3 (ref 0–0.07)
IMM GRANULOCYTES NFR BLD AUTO: 0.4 %
LYMPHOCYTES ABSOLUTE: 1.4 THOU/MM3 (ref 1–4.8)
LYMPHOCYTES NFR BLD AUTO: 16.9 %
MCH RBC QN AUTO: 26.3 PG (ref 26–33)
MCHC RBC AUTO-ENTMCNC: 31.9 GM/DL (ref 32.2–35.5)
MCV RBC AUTO: 82.5 FL (ref 81–99)
MONOCYTES ABSOLUTE: 0.9 THOU/MM3 (ref 0.4–1.3)
MONOCYTES NFR BLD AUTO: 11 %
NEUTROPHILS ABSOLUTE: 5.8 THOU/MM3 (ref 1.8–7.7)
NEUTROPHILS NFR BLD AUTO: 70.9 %
NRBC BLD AUTO-RTO: 0 /100 WBC
PLATELET # BLD AUTO: 243 THOU/MM3 (ref 130–400)
PMV BLD AUTO: 9.3 FL (ref 9.4–12.4)
RBC # BLD AUTO: 4.41 MILL/MM3 (ref 4.2–5.4)
WBC # BLD AUTO: 8.2 THOU/MM3 (ref 4.8–10.8)

## 2024-08-16 PROCEDURE — 36415 COLL VENOUS BLD VENIPUNCTURE: CPT

## 2024-08-16 PROCEDURE — 6360000002 HC RX W HCPCS

## 2024-08-16 PROCEDURE — 2580000003 HC RX 258

## 2024-08-16 PROCEDURE — 99233 SBSQ HOSP IP/OBS HIGH 50: CPT | Performed by: PHYSICIAN ASSISTANT

## 2024-08-16 PROCEDURE — 1200000003 HC TELEMETRY R&B

## 2024-08-16 PROCEDURE — 85025 COMPLETE CBC W/AUTO DIFF WBC: CPT

## 2024-08-16 PROCEDURE — 6370000000 HC RX 637 (ALT 250 FOR IP): Performed by: PHYSICIAN ASSISTANT

## 2024-08-16 PROCEDURE — 6370000000 HC RX 637 (ALT 250 FOR IP)

## 2024-08-16 RX ORDER — DIPHENHYDRAMINE HCL 25 MG
25 TABLET ORAL EVERY 6 HOURS PRN
Status: DISCONTINUED | OUTPATIENT
Start: 2024-08-16 | End: 2024-08-17 | Stop reason: HOSPADM

## 2024-08-16 RX ADMIN — ENOXAPARIN SODIUM 30 MG: 100 INJECTION SUBCUTANEOUS at 21:05

## 2024-08-16 RX ADMIN — VANCOMYCIN HYDROCHLORIDE 1250 MG: 5 INJECTION, POWDER, LYOPHILIZED, FOR SOLUTION INTRAVENOUS at 09:55

## 2024-08-16 RX ADMIN — DIPHENHYDRAMINE HYDROCHLORIDE 25 MG: 25 TABLET ORAL at 22:51

## 2024-08-16 RX ADMIN — SODIUM CHLORIDE, PRESERVATIVE FREE 10 ML: 5 INJECTION INTRAVENOUS at 21:04

## 2024-08-16 RX ADMIN — DIPHENHYDRAMINE HYDROCHLORIDE 25 MG: 25 TABLET ORAL at 12:28

## 2024-08-16 RX ADMIN — ACETAMINOPHEN 650 MG: 325 TABLET ORAL at 03:31

## 2024-08-16 RX ADMIN — ACETAMINOPHEN 650 MG: 325 TABLET ORAL at 17:20

## 2024-08-16 RX ADMIN — DIPHENHYDRAMINE HYDROCHLORIDE 25 MG: 25 TABLET ORAL at 17:20

## 2024-08-16 ASSESSMENT — PAIN SCALES - GENERAL: PAINLEVEL_OUTOF10: 4

## 2024-08-16 ASSESSMENT — PAIN DESCRIPTION - LOCATION: LOCATION: HAND

## 2024-08-16 NOTE — H&P
08/15/2024 08:23 PM      All CTs at this facility use dose modulation techniques and iterative    reconstructions, and/or weight-based dosing   when appropriate to reduce radiation to a low as reasonably achievable.      XR CHEST (2 VW)   Final Result   1. No acute cardiopulmonary abnormality.      This document has been electronically signed by: Charly Girard MD on    08/15/2024 07:13 PM      MRI LUMBAR SPINE W CONTRAST    (Results Pending)   MRI THORACIC SPINE W CONTRAST    (Results Pending)   MRI CERVICAL SPINE WITH CONTRAST    (Results Pending)     CT LUMBAR RECONSTRUCTION WO POST PROCESS    Result Date: 8/15/2024  CT lumbar spine without contrast. COMPARISON: None FINDINGS: Normal curvature of the lumbar spine. Vertebral body heights are maintained. IUD present. Otherwise the visualized paraspinal soft tissues are unremarkable. L5-S1: Intervertebral disc is normal in height. No significant disc bulge or central canal stenosis. L4-L5: Intervertebral disc is normal in height. No significant disc bulge or central canal stenosis. L3-L4: Intervertebral disc is normal in height. No significant disc bulge or central canal stenosis. L2-L3: Intervertebral disc is normal in height. No significant disc bulge or central canal stenosis. L1-L2: Intervertebral disc is normal in height. No significant disc bulge or central canal stenosis.     1. No acute osseous or alignment abnormality of the lumbar spine. This document has been electronically signed by: Charly Girard MD on 08/15/2024 08:23 PM All CTs at this facility use dose modulation techniques and iterative reconstructions, and/or weight-based dosing when appropriate to reduce radiation to a low as reasonably achievable.    CT ABDOMEN PELVIS W IV CONTRAST Additional Contrast? None    Result Date: 8/15/2024  CT abdomen and pelvis with IV contrast. COMPARISON: None FINDINGS: Lower Chest: Partially visualized lung bases are unremarkable. Abdomen: Hyperattenuating lesion  measuring 2.6 x 1.7 x 2.0 cm in segment 5 of the liver. Gallbladder is not seen. Normal spleen. Normal pancreas. Normal adrenal glands. Symmetric renal enhancement. No hydronephrosis. Normal appendix. Mild colonic stool burden. No bowel obstruction. No mesenteric or retroperitoneal lymphadenopathy. Normal abdominal aorta. Pelvis: Normal appearance of the urinary bladder. IUD present. No adnexal mass. Musculoskeletal: Small fat containing umbilical hernia. No acute fracture or suspicious osseous abnormality.     1. No acute intra-abdominal or pelvic findings. 2. Hyperattenuating lesion within the right lobe of the liver measuring up to 2.6 cm may represent a flash filling hemangioma. Recommend comparison with prior imaging if available. If none available, recommend nonemergent hepatic protocol CT or MR for further characterization. This document has been electronically signed by: Charly Girard MD on 08/15/2024 08:22 PM All CTs at this facility use dose modulation techniques and iterative reconstructions, and/or weight-based dosing when appropriate to reduce radiation to a low as reasonably achievable.    CT SOFT TISSUE NECK W CONTRAST    Result Date: 8/15/2024  CT soft tissue neck with IV contrast. COMPARISON: None FINDINGS: Visualized lung apices are clear. Normal thyroid gland. Normal submandibular glands. Normal parotid glands. Orbital soft tissues are unremarkable. Visualized paranasal sinuses and mastoid air cells are clear. Parapharyngeal fat pads are preserved. Enlarged palatine tonsils bilaterally with striated enhancement. No peritonsillar or tonsillar abscess. Normal epiglottis. Visualized portions of the trachea and esophagus are unremarkable. No supraclavicular lymphadenopathy. Multiple prominent and enlarged jugular chain lymph nodes. For example right jugular chain lymph node measuring 2.1 x 2.2 cm (series 5, image 42) Left jugular chain lymph node measuring 1.3 x 1.2 cm (series 5, image 44). Major

## 2024-08-16 NOTE — PROGRESS NOTES
Pharmacist Review and Automatic Dose Adjustment of Prophylactic Enoxaparin or Heparin    Reviewed reason for admission/hospital problem list    The reviewing pharmacist has made an adjustment to the ordered enoxaparin or heparin dose or converted to heparin per the approved [facility] protocol and table as identified below.      Recent Labs     08/15/24  1830   CREATININE 0.8     Estimated Creatinine Clearance: 131 mL/min (based on SCr of 0.8 mg/dL).    Recent Labs     08/15/24  1830   HGB 12.6   HCT 38.4        No results for input(s): \"INR\" in the last 72 hours.    Height:   Ht Readings from Last 1 Encounters:   08/10/24 1.626 m (5' 4\")     Weight:  Wt Readings from Last 1 Encounters:   08/10/24 104.3 kg (230 lb)         Plan: Based upon the patient's weight and renal function, enoxaparin 40 mg subq daily will be changed to enoxaparin 30 mg subq BID.     Thank you,  Alesha Linares [facility] 8/15/2024 8:57 PM

## 2024-08-16 NOTE — ED NOTES
Pt at MRI at this time. Family in pt ED room. MRI will call this ED when finished in approx 40 minutes to transport pt with ED staff to IP bed. Writer to escort pt family to IP room 0R11.

## 2024-08-16 NOTE — PROGRESS NOTES
Patient report new rash on her face and right palm that had started when she came came in the hospital. , made aware of her complaint.

## 2024-08-16 NOTE — CONSULTS
CONSULTATION NOTE :ID   Patient - Anna Dale,  Age - 23 y.o.    - 2000      Room Number - 8B-32/032-A   N -  [id number]   [facility] # - [id number]  Date of Admission -  8/15/2024  6:05 PM  Patient's PCP: Lance Rivera MD     Requesting Physician: Thea Bryson PA    REASON FOR CONSULTATION   Fever  CHIEF COMPLAINT   SOB and Fever    HISTORY OF PRESENT ILLNESS   This is a very pleasant 23 y.o. female who was admitted to the hospital with a chief complaints of fever, shortness of breath and back pain.   Patient was examined at bedside today with Dr. Joaquin.    Patient reports symptom including intermittent fevers, myalgias and sore throat going on for about 5 days. Denies any chest pain, headaches, difficulty breathing, abdominal pain, N/V/D, urinary symptoms, leg swelling, numbness or tingling, or any other associated symptoms at this time.     On imaging, pt noted to have bilateral tonsilitis with enlarged cervical Lymph nodes.  Respiratory panel (+) Rhinovirus. No other infectious etiology noted on further workup.   PAST MEDICAL  HISTORY     History reviewed. No pertinent past medical history.    PAST SURGICAL HISTORY     History reviewed. No pertinent surgical history.      MEDICATIONS:       Scheduled Meds:   vancomycin (VANCOCIN) intermittent dosing (placeholder)   Other RX Placeholder    vancomycin  1,250 mg IntraVENous Q8H    sodium chloride flush  5-40 mL IntraVENous 2 times per day    enoxaparin  30 mg SubCUTAneous Q12H    cefTRIAXone (ROCEPHIN) IV  2,000 mg IntraVENous Q12H     Continuous Infusions:   sodium chloride       PRN Meds:sodium chloride flush, sodium chloride, acetaminophen **OR** acetaminophen  Allergies:   ALLERGIES:    Patient has no known allergies.        SOCIAL HISTORY:     TOBACCO:   reports that she is a non-smoker but has been exposed to tobacco smoke. She has never used smokeless tobacco.     ETOH:   reports no history of alcohol  hours.  Phosphorus:No results for input(s): \"PHOS\" in the last 72 hours.  BNP:No results for input(s): \"BNP\" in the last 72 hours.  Glucose:No results for input(s): \"POCGLU\" in the last 72 hours.  HgbA1C: No results for input(s): \"LABA1C\" in the last 72 hours.  INR: No results for input(s): \"INR\" in the last 72 hours.  Hepatic:   Recent Labs     08/15/24  1830   ALKPHOS 83   ALT 29   AST 26   BILITOT 0.4     Amylase and Lipase:  Recent Labs     08/15/24  1830   LACTA 1.4     Lactic Acid:   Recent Labs     08/15/24  1830   LACTA 1.4     Troponin:   Recent Labs     08/15/24  1924   CKTOTAL 41     BNP: No results for input(s): \"BNP\" in the last 72 hours.  Lipids: No results for input(s): \"CHOL\", \"TRIG\", \"HDL\", \"LDL\" in the last 72 hours.    Invalid input(s): \"LDLCALC\"  ABGs: No results found for: \"PHART\", \"PO2ART\", \"JZF4PTM\", \"LZX9TGO\", \"BEART\"    Cultures:      CXR: showed no acute intrathoracic processes.    UA:   Recent Labs     08/15/24  1830   PHUR 6.0   COLORU YELLOW   PROTEINU 30*   BLOODU NEGATIVE   RBCUA 5-10   WBCUA 50-75   BACTERIA MODERATE   NITRU NEGATIVE   GLUCOSEU NEGATIVE   BILIRUBINUR NEGATIVE   UROBILINOGEN 1.0   KETUA 80*       Micro: No results found for: \"BC\"    Problem list of patient      Patient Active Problem List   Diagnosis Code    Sepsis (HCC) A41.9     Impression and Recommendation:   This is an overall healthy 23 year old female with no significant past medical or surgical history that is admitted for concerns of fever, sore throat and shortness or breath.  Further workup with labs showed Resp panel  (+) Rhinovirus.  And Imaging showed findings consistent with bilateral tonsilitis and enlarged LNs    Acute viral illness 2/2 Rhinovirus  - Recommend stopping abx   - Tylenol prn for pain and fevers  - Maintain good oral intake and hydration  - Plan to observe today and possible discharge later today or tomorrow - per hospital team    Thank you Thea Bryson PA for allowing me to participate

## 2024-08-16 NOTE — ED NOTES
Phone call placed to [facility] who approved pt transport to [facility] in stable condition.

## 2024-08-16 NOTE — CARE COORDINATION
Case Management Assessment Initial Evaluation    Date/Time of Evaluation: 2024 11:47 AM  Assessment Completed by: Libra Abbasi RN    If patient is discharged prior to next notation, then this note serves as note for discharge by case management.    Patient Name: Anna Dale                   YOB: 2000  Diagnosis: Sore throat [J02.9]  Septicemia (HCC) [A41.9]  Sepsis (HCC) [A41.9]  Acute tonsillitis, unspecified etiology [J03.90]  Acute midline low back pain without sciatica [M54.50]                   Date / Time: 8/15/2024  6:05 PM  Location: [address] [place] [address]     Patient Admission Status: Inpatient   Readmission Risk Low 0-14, Mod 15-19), High > 20: Readmission Risk Score: 7    Current PCP: Lance Rivera MD  Health Care Decision Makers:   Primary Decision Maker: Catracho Dale - Parent, Legal Guardian - [phone]    Primary Decision Maker: Jessica griggs - Parent - [phone]    Additional Case Management Notes:  Fevers. IV rocephin. IV vanc. Hospitalist following. New ID consult. Feels r/t viral illness. Plan to stop atb. Monitor today, discharge this evening vs tomorrow.     Procedures: none    Imagin/15 CT lumbar:   1. No acute intra-abdominal or pelvic findings.   2. Hyperattenuating lesion within the right lobe of the liver measuring up   to 2.6 cm may represent a flash filling hemangioma. Recommend comparison   with prior imaging if available. If none available, recommend nonemergent   hepatic protocol CT or MR for further characterization.     Patient Goals/Plan/Treatment Preferences: Spoke with Anna, plans to return home with her family. She is independent and denies discharge needs.          24 1143   Service Assessment   Patient Orientation Alert and Oriented   Cognition Alert   History Provided By Patient   Primary Caregiver Self   Support Systems Parent   Patient's Healthcare Decision Maker is: Patient Declined (Legal Next of Kin Remains as Decision Maker)    PCP Verified by CM Yes   Last Visit to PCP Within last year   Prior Functional Level Independent in ADLs/IADLs   Current Functional Level Independent in ADLs/IADLs   Can patient return to prior living arrangement Yes   Ability to make needs known: Good   Family able to assist with home care needs: Yes   Would you like for me to discuss the discharge plan with any other family members/significant others, and if so, who? Yes  (parent present)   Financial Resources Other (Comment)  ([facility])   Community Resources None   Social/Functional History   Active  Yes   Discharge Planning   Type of Residence House   Living Arrangements Family Members   Current Services Prior To Admission None   Potential Assistance Needed N/A   DME Ordered? No   Potential Assistance Purchasing Medications No   Type of Home Care Services None   Services At/After Discharge   Transition of Care Consult (CM Consult) Discharge Planning   Services At/After Discharge None   Mode of Transport at Discharge Other (see comment)  (family)   Confirm Follow Up Transport Self   Condition of Participation: Discharge Planning   The Plan for Transition of Care is related to the following treatment goals: get home

## 2024-08-16 NOTE — ED NOTES
ED to inpatient nurses report      Chief Complaint:  Chief Complaint   Patient presents with    Shortness of Breath    Back Pain     Present to ED from: home    MOA:     LOC: alert and orientated to name, place, date  Mobility: Independent  Oxygen Baseline: 100%    Current needs required: RA     Code Status:   Full Code    What abnormal results were found and what did you give/do to treat them? Septicemia   Any procedures or intervention occur? CT, labs, tonsillitis     Mental Status:  Level of Consciousness: Alert (0)    Psych Assessment:        Vitals:  Patient Vitals for the past 24 hrs:   BP Temp Temp src Pulse Resp SpO2   08/15/24 2102 139/87 -- -- (!) 112 16 100 %   08/15/24 2010 111/69 100.3 °F (37.9 °C) Oral (!) 117 20 100 %   08/15/24 1819 117/69 (!) 100.5 °F (38.1 °C) Oral (!) 128 22 95 %   08/15/24 1809 133/63 99.7 °F (37.6 °C) Oral (!) 128 16 96 %        LDAs:   Peripheral IV 08/15/24 Proximal;Right Forearm (Active)   Site Assessment Clean, dry & intact 08/15/24 2103   Line Status Infusing 08/15/24 2103   Phlebitis Assessment No symptoms 08/15/24 2103   Infiltration Assessment 0 08/15/24 2103       Ambulatory Status:  No data recorded    Diagnosis:  DISPOSITION Admitted 08/15/2024 08:49:16 PM   Final diagnoses:   Septicemia (HCC)   Acute midline low back pain without sciatica   Acute tonsillitis, unspecified etiology   Sore throat        Consults:  PHARMACY TO DOSE VANCOMYCIN  PHARMACY TO DOSE VANCOMYCIN     Pain Score:  Pain Assessment  Pain Assessment: 0-10  Pain Level: 9  Pain Location: Back, Head  Pain Type: Acute pain  Pain Frequency: Continuous    C-SSRS:   Risk of Suicide: No Risk    Sepsis Screening:       Lagrange Fall Risk:       Swallow Screening        Preferred Language:   English      ALLERGIES     Patient has no known allergies.    SURGICAL HISTORY     History reviewed. No pertinent surgical history.    PAST MEDICAL HISTORY     History reviewed. No pertinent past medical

## 2024-08-16 NOTE — ED NOTES
Patient resting on cot. VS stable. Telemetry in place. Call light in reach. Patient covered up with blanket, lights dimmed for comfort. Patient denies needs at this time.

## 2024-08-16 NOTE — PROGRESS NOTES
[facility]   Pharmacy Pharmacokinetic Monitoring Service - Vancomycin     Anna Dale is a 23 y.o. female starting on vancomycin therapy for sepsis. Pharmacy consulted by Olena Jerez for monitoring and adjustment.    Target Concentration: Goal AUC/TATIANA 400-600 mg*hr/L    Additional Antimicrobials: Rocephin    Pertinent Laboratory Values:   Wt Readings from Last 1 Encounters:   08/15/24 102.6 kg (226 lb 4.8 oz)     Temp Readings from Last 1 Encounters:   08/15/24 99.6 °F (37.6 °C) (Oral)     Estimated Creatinine Clearance: 148 mL/min (based on SCr of 0.7 mg/dL).  Recent Labs     08/15/24  1830 08/15/24  2057   CREATININE 0.8 0.7   BUN 9 8   WBC 12.6*  --      Pertinent Cultures:  Date Source Results   08/15/24 BCx2, UC, throat    08/15/24 Respiratory biofire Rhinovirus/enterovirus     Plan:  Dosing recommendations based on Bayesian software  Start vancomycin 2500 mg iv load followed by 1250 mg every 8 hours  Anticipated AUC of 539 and trough concentration of 16.1 at steady state  Renal labs as indicated  Pharmacy will continue to monitor patient and adjust therapy as indicated    Thank you for the consult,  Vanda Sood [facility] PharmD  8/16/2024   12:54 AM

## 2024-08-16 NOTE — PROGRESS NOTES
Hospitalist Progress Note      Patient:  Anna Dale 23 y.o. female     Unit/Bed:8B-32/032-A    Date of Admission: 8/15/2024      ASSESSMENT AND PLAN    Active Problems  Acute Viral Illness   2/2 Rhinovirus vs HFM. Pt states that her daughter had HFM last week and now the patient is developing a rash on her face, hands & feet. This could also be post-viral rash.   Stopped ABX. Will monitor the next 24 hours. WBC trended down to 8.2 from 12.6. CBC in the AM.   Respiratory Panel PCR grew Rhinovirus.   Blood cultures pending. COVID negative. Flu negative.   ID consulted, case discussed with service & their notes reviewed, appreciate recommendations.    Patient's father at bedside and states that she has been under much stress lately and recently had a foot issue resulting in needing multiple ED visits.   Resolved Problems  Hypokalemia: Potassium 3.7, resolved. BMP in the AM.          LDA: []CVC / []PICC / []Midline / []Vasquez / []Drains / []Mediport / [x]None  Antibiotics: None   Steroids: None   Labs (still needed?): []Yes / [x]No  IVF (still needed?): []Yes / [x]No    Level of care: []Step Down / [x]Med-Surg  Bed Status: [x]Inpatient / []Observation  Telemetry: []Yes / [x]No  PT/OT: []Yes / [x]No    DVT Prophylaxis: [x] Lovenox / [] Heparin / [] SCDs / [] Already on Systemic Anticoagulation / [] None   Code status: Full Code     Expected discharge date:  Tomorrow   Disposition: Home      ===================================================================    Chief Complaint: Fever   Subjective (past 24 hours): Pt states that she is feeling better this AM. Pt resting in bed and denies pain, fever or SOB at this time.       HPI / Hospital Course:  Initial H&P \"Anna Dale is a 23 y.o. female with with a recent history of frequent UTI's and headaches,  who presented to [facility] with chief complaint of back pain, fever, sore throat, shortness of breath. Symptoms began about 2-3 days ago and have progressively

## 2024-08-16 NOTE — PLAN OF CARE
Problem: Safety - Adult  Goal: Free from fall injury  Outcome: Progressing     Problem: Pain  Goal: Verbalizes/displays adequate comfort level or baseline comfort level  Outcome: Progressing  Flowsheets (Taken 8/16/2024 2762)  Verbalizes/displays adequate comfort level or baseline comfort level:   Encourage patient to monitor pain and request assistance   Assess pain using appropriate pain scale

## 2024-08-17 VITALS
OXYGEN SATURATION: 98 % | BODY MASS INDEX: 38.64 KG/M2 | HEIGHT: 64 IN | RESPIRATION RATE: 16 BRPM | SYSTOLIC BLOOD PRESSURE: 133 MMHG | TEMPERATURE: 98 F | HEART RATE: 91 BPM | DIASTOLIC BLOOD PRESSURE: 81 MMHG | WEIGHT: 226.3 LBS

## 2024-08-17 LAB
ANION GAP SERPL CALC-SCNC: 10 MEQ/L (ref 8–16)
BACTERIA BLD AEROBE CULT: NORMAL
BACTERIA BLD AEROBE CULT: NORMAL
BACTERIA THROAT AEROBE CULT: NORMAL
BACTERIA UR CULT: ABNORMAL
BUN SERPL-MCNC: 7 MG/DL (ref 7–22)
CALCIUM SERPL-MCNC: 8.4 MG/DL (ref 8.5–10.5)
CHLORIDE SERPL-SCNC: 107 MEQ/L (ref 98–111)
CO2 SERPL-SCNC: 22 MEQ/L (ref 23–33)
CREAT SERPL-MCNC: 0.6 MG/DL (ref 0.4–1.2)
DEPRECATED RDW RBC AUTO: 41.6 FL (ref 35–45)
EKG ATRIAL RATE: 129 BPM
EKG P AXIS: 42 DEGREES
EKG P-R INTERVAL: 144 MS
EKG Q-T INTERVAL: 300 MS
EKG QRS DURATION: 82 MS
EKG QTC CALCULATION (BAZETT): 439 MS
EKG R AXIS: 73 DEGREES
EKG T AXIS: 17 DEGREES
EKG VENTRICULAR RATE: 129 BPM
ERYTHROCYTE [DISTWIDTH] IN BLOOD BY AUTOMATED COUNT: 14 % (ref 11.5–14.5)
GFR SERPL CREATININE-BSD FRML MDRD: > 90 ML/MIN/1.73M2
GLUCOSE SERPL-MCNC: 105 MG/DL (ref 70–108)
HCT VFR BLD AUTO: 38.6 % (ref 37–47)
HGB BLD-MCNC: 12.2 GM/DL (ref 12–16)
MCH RBC QN AUTO: 26.1 PG (ref 26–33)
MCHC RBC AUTO-ENTMCNC: 31.6 GM/DL (ref 32.2–35.5)
MCV RBC AUTO: 82.7 FL (ref 81–99)
ORGANISM: ABNORMAL
PLATELET # BLD AUTO: 266 THOU/MM3 (ref 130–400)
PMV BLD AUTO: 9.4 FL (ref 9.4–12.4)
POTASSIUM SERPL-SCNC: 4.1 MEQ/L (ref 3.5–5.2)
RBC # BLD AUTO: 4.67 MILL/MM3 (ref 4.2–5.4)
SODIUM SERPL-SCNC: 139 MEQ/L (ref 135–145)
WBC # BLD AUTO: 6.4 THOU/MM3 (ref 4.8–10.8)

## 2024-08-17 PROCEDURE — 6370000000 HC RX 637 (ALT 250 FOR IP): Performed by: PHYSICIAN ASSISTANT

## 2024-08-17 PROCEDURE — 80048 BASIC METABOLIC PNL TOTAL CA: CPT

## 2024-08-17 PROCEDURE — 36415 COLL VENOUS BLD VENIPUNCTURE: CPT

## 2024-08-17 PROCEDURE — 93010 ELECTROCARDIOGRAM REPORT: CPT | Performed by: INTERNAL MEDICINE

## 2024-08-17 PROCEDURE — 85027 COMPLETE CBC AUTOMATED: CPT

## 2024-08-17 RX ADMIN — PHENOL 1 SPRAY: 1.5 LIQUID ORAL at 04:21

## 2024-08-17 ASSESSMENT — PAIN DESCRIPTION - DESCRIPTORS
DESCRIPTORS: BURNING
DESCRIPTORS: BURNING

## 2024-08-17 ASSESSMENT — PAIN DESCRIPTION - PAIN TYPE: TYPE: ACUTE PAIN

## 2024-08-17 ASSESSMENT — PAIN - FUNCTIONAL ASSESSMENT
PAIN_FUNCTIONAL_ASSESSMENT: ACTIVITIES ARE NOT PREVENTED
PAIN_FUNCTIONAL_ASSESSMENT: ACTIVITIES ARE NOT PREVENTED

## 2024-08-17 ASSESSMENT — PAIN DESCRIPTION - LOCATION
LOCATION: THROAT
LOCATION: HAND

## 2024-08-17 ASSESSMENT — PAIN DESCRIPTION - ORIENTATION
ORIENTATION: LEFT;RIGHT
ORIENTATION: POSTERIOR

## 2024-08-17 ASSESSMENT — PAIN SCALES - GENERAL: PAINLEVEL_OUTOF10: 8

## 2024-08-17 ASSESSMENT — PAIN DESCRIPTION - FREQUENCY: FREQUENCY: CONTINUOUS

## 2024-08-17 NOTE — PROGRESS NOTES
Discharge teaching and instructions for diagnosis/procedure of sepsis completed with patient using teachback method. AVS reviewed. Printed prescriptions given to patient. Patient voiced understanding regarding prescriptions, follow up appointments, and care of self at home. Discharged ambulatory to  home with support per family

## 2024-08-20 LAB
BACTERIA BLD AEROBE CULT: NORMAL
BACTERIA BLD AEROBE CULT: NORMAL

## 2024-08-23 NOTE — DISCHARGE SUMMARY
appropriate to reduce radiation to a low as reasonably achievable.      CT ABDOMEN PELVIS W IV CONTRAST Additional Contrast? None   Final Result   1. No acute intra-abdominal or pelvic findings.   2. Hyperattenuating lesion within the right lobe of the liver measuring up    to 2.6 cm may represent a flash filling hemangioma. Recommend comparison    with prior imaging if available. If none available, recommend nonemergent    hepatic protocol CT or MR for further characterization.      This document has been electronically signed by: Charly Girard MD on    08/15/2024 08:22 PM      All CTs at this facility use dose modulation techniques and iterative    reconstructions, and/or weight-based dosing   when appropriate to reduce radiation to a low as reasonably achievable.      CT LUMBAR RECONSTRUCTION WO POST PROCESS   Final Result   1. No acute osseous or alignment abnormality of the lumbar spine.      This document has been electronically signed by: Charly Girard MD on    08/15/2024 08:23 PM      All CTs at this facility use dose modulation techniques and iterative    reconstructions, and/or weight-based dosing   when appropriate to reduce radiation to a low as reasonably achievable.      XR CHEST (2 VW)   Final Result   1. No acute cardiopulmonary abnormality.      This document has been electronically signed by: Charly Girard MD on    08/15/2024 07:13 PM             Consults:     PHARMACY TO DOSE VANCOMYCIN    Disposition:    [x] Home       [] TCU       [] Rehab       [] Psych       [] SNF       [] Long Term Care Facility       [] Other-    Condition at Discharge: Stable    Code Status:  Prior     Patient Instructions:    Discharge lab work: None  Activity: activity as tolerated  Diet: No diet orders on file      Follow-up visits:   Lance Rivera MD  [address] [place] [address]  [facility] [address]  [phone]    Follow up on 8/26/2024  Appointment August 26, 2024 at 3:30pm         Discharge Medications:

## 2024-11-18 ENCOUNTER — OFFICE VISIT (OUTPATIENT)
Dept: PSYCHIATRY | Age: 24
End: 2024-11-18
Payer: COMMERCIAL

## 2024-11-18 DIAGNOSIS — F43.9 STRESS AT HOME: ICD-10-CM

## 2024-11-18 DIAGNOSIS — F43.9 TRAUMA AND STRESSOR-RELATED DISORDER: ICD-10-CM

## 2024-11-18 DIAGNOSIS — F41.1 GENERALIZED ANXIETY DISORDER: ICD-10-CM

## 2024-11-18 DIAGNOSIS — F43.23 ADJUSTMENT DISORDER WITH MIXED ANXIETY AND DEPRESSED MOOD: Primary | ICD-10-CM

## 2024-11-18 DIAGNOSIS — G47.00 INSOMNIA, UNSPECIFIED TYPE: ICD-10-CM

## 2024-11-18 PROCEDURE — 90792 PSYCH DIAG EVAL W/MED SRVCS: CPT

## 2024-11-18 PROCEDURE — 1036F TOBACCO NON-USER: CPT

## 2024-11-18 RX ORDER — VENLAFAXINE HYDROCHLORIDE 75 MG/1
150 CAPSULE, EXTENDED RELEASE ORAL DAILY
Qty: 60 CAPSULE | Refills: 0 | Status: SHIPPED | OUTPATIENT
Start: 2024-11-18 | End: 2024-12-18

## 2024-11-18 ASSESSMENT — ANXIETY QUESTIONNAIRES
5. BEING SO RESTLESS THAT IT IS HARD TO SIT STILL: SEVERAL DAYS
3. WORRYING TOO MUCH ABOUT DIFFERENT THINGS: SEVERAL DAYS
GAD7 TOTAL SCORE: 11
IF YOU CHECKED OFF ANY PROBLEMS ON THIS QUESTIONNAIRE, HOW DIFFICULT HAVE THESE PROBLEMS MADE IT FOR YOU TO DO YOUR WORK, TAKE CARE OF THINGS AT HOME, OR GET ALONG WITH OTHER PEOPLE: VERY DIFFICULT
1. FEELING NERVOUS, ANXIOUS, OR ON EDGE: MORE THAN HALF THE DAYS
7. FEELING AFRAID AS IF SOMETHING AWFUL MIGHT HAPPEN: SEVERAL DAYS
6. BECOMING EASILY ANNOYED OR IRRITABLE: MORE THAN HALF THE DAYS
4. TROUBLE RELAXING: NEARLY EVERY DAY
2. NOT BEING ABLE TO STOP OR CONTROL WORRYING: SEVERAL DAYS

## 2024-11-18 ASSESSMENT — PATIENT HEALTH QUESTIONNAIRE - PHQ9
SUM OF ALL RESPONSES TO PHQ QUESTIONS 1-9: 13
10. IF YOU CHECKED OFF ANY PROBLEMS, HOW DIFFICULT HAVE THESE PROBLEMS MADE IT FOR YOU TO DO YOUR WORK, TAKE CARE OF THINGS AT HOME, OR GET ALONG WITH OTHER PEOPLE: VERY DIFFICULT
3. TROUBLE FALLING OR STAYING ASLEEP: MORE THAN HALF THE DAYS
1. LITTLE INTEREST OR PLEASURE IN DOING THINGS: MORE THAN HALF THE DAYS
SUM OF ALL RESPONSES TO PHQ QUESTIONS 1-9: 13
7. TROUBLE CONCENTRATING ON THINGS, SUCH AS READING THE NEWSPAPER OR WATCHING TELEVISION: MORE THAN HALF THE DAYS
SUM OF ALL RESPONSES TO PHQ QUESTIONS 1-9: 13
5. POOR APPETITE OR OVEREATING: MORE THAN HALF THE DAYS
SUM OF ALL RESPONSES TO PHQ9 QUESTIONS 1 & 2: 4
9. THOUGHTS THAT YOU WOULD BE BETTER OFF DEAD, OR OF HURTING YOURSELF: NOT AT ALL
2. FEELING DOWN, DEPRESSED OR HOPELESS: MORE THAN HALF THE DAYS
8. MOVING OR SPEAKING SO SLOWLY THAT OTHER PEOPLE COULD HAVE NOTICED. OR THE OPPOSITE, BEING SO FIGETY OR RESTLESS THAT YOU HAVE BEEN MOVING AROUND A LOT MORE THAN USUAL: NOT AT ALL
6. FEELING BAD ABOUT YOURSELF - OR THAT YOU ARE A FAILURE OR HAVE LET YOURSELF OR YOUR FAMILY DOWN: SEVERAL DAYS
4. FEELING TIRED OR HAVING LITTLE ENERGY: MORE THAN HALF THE DAYS
SUM OF ALL RESPONSES TO PHQ QUESTIONS 1-9: 13

## 2024-11-18 NOTE — PATIENT INSTRUCTIONS
-Please take medications as prescribed  -Refrain from alcohol or drug use  -Seek emergency help via the emergency and/or calling 911 should symptoms become severe, worsen, or with other concerning symptoms.Go immediately to the emergency room and/or call 911 with any suicidal or homicidal ideations or if audio/visual hallucinations develop.   -Contact office with any questions or concerns.     Crisis phone numbers:  [facility] [phone].  [facility] [phone]  [facility] [phone].  [facility] [phone].  [facility] [phone].  [facility] [phone].

## 2024-11-18 NOTE — PROGRESS NOTES
dry. Patient is not diaphoretic.         Mental Status Evaluation:   Orientation: Alert, oriented, thought content appropriate   Mood:. Anxious, Depressed, and Irritable      Affect:  Mood Congruent      Appearance:  Casually Dressed and Clean   Activity:  Cooperative and Good Eye Contact   Gait/Posture: Normal   Speech:  Clear, Fluent, Normal Pitch and Volume, Age and Situation Appropriate   Thought Process:  Within Normal Limits   Thought Content:  Within Normal Limits   Cognition:  Grossly Intact   Memory: Intact   Insight:  Limited   Judgment: Limited   Suicidal Ideations: Denies Suicidal Ideation   Homicidal Ideations: Negative for homicidal ideation   Medication Side Effects: Absent       Attention Span Attention span and concentration were age appropriate           Assessment & Plan   DIAGNOSIS AND ASSESSMENT DATA     DIAGNOSIS:   1. Adjustment disorder with mixed anxiety and depressed mood    2. Generalized anxiety disorder    3. Trauma and stressor-related disorder    4. Insomnia, unspecified type    5. Stress at home        PLAN   Follow-up:  Return in about 4 weeks (around 12/16/2024), or if symptoms worsen or fail to improve.    Prescriptions for this encounter:  New Prescriptions    No medications on file       Orders Placed This Encounter   Medications    venlafaxine (EFFEXOR XR) 75 MG extended release capsule     Sig: Take 2 capsules by mouth daily     Dispense:  60 capsule     Refill:  0       Medications Discontinued During This Encounter   Medication Reason    venlafaxine (EFFEXOR XR) 75 MG extended release capsule REORDER       Additional orders:  No orders of the defined types were placed in this encounter.    Condition: Patient appears in no acute distress  Patient is reporting depressive symptoms although history provided does not correlate with MDD, will rule out.  Patient reports a concern for BPD, due to time unable to assess we will screen next visit and rule out.  Patient also reports a

## 2024-11-19 PROBLEM — F41.1 GENERALIZED ANXIETY DISORDER: Status: ACTIVE | Noted: 2024-11-19

## 2024-11-19 PROBLEM — G47.00 INSOMNIA: Status: ACTIVE | Noted: 2024-11-19

## 2024-11-19 PROBLEM — F43.9 TRAUMA AND STRESSOR-RELATED DISORDER: Status: ACTIVE | Noted: 2024-11-19

## 2024-11-19 PROBLEM — F43.9 STRESS AT HOME: Status: ACTIVE | Noted: 2024-11-19

## 2024-11-19 PROBLEM — F43.23 ADJUSTMENT DISORDER WITH MIXED ANXIETY AND DEPRESSED MOOD: Status: ACTIVE | Noted: 2024-11-19
